# Patient Record
Sex: FEMALE | Race: WHITE | NOT HISPANIC OR LATINO | Employment: FULL TIME | URBAN - METROPOLITAN AREA
[De-identification: names, ages, dates, MRNs, and addresses within clinical notes are randomized per-mention and may not be internally consistent; named-entity substitution may affect disease eponyms.]

---

## 2024-11-14 NOTE — PROGRESS NOTES
"[facility]  Initial Evaluation    Referring Provider:  Amy Sage MD  [address] [facility] [address]  [address]  [place] [address]    Chief Complaint: \"I think I have ADHD\"     History of Present Illness: Rossi Figueroa is a 19 y.o. female who presents today for initial evaluation regarding ADHD evaluation. Rossi presents unaccompanied in no acute distress and engages with me appropriately. Psychotropic regimen with which patient presents is described as none.     Screening and history is positive for some of the following symptoms of depression-low mood, low energy, anhedonia, changes in sleep, changes in appetite, guilt, poor concentration, psychomotor changes, thoughts of being better off dead. \"I do not think I have depression.\" \"I have fatigue..my appetite has increased.\"   History is positive for signs/symptoms suggestive of trouble concentrating, trouble completing tasks, making errors in routine tasks, issues remembering obligations, trouble with organization, fidgeting, and associated irritability/anxiety with these deficits. \"I am scattered.\" \"I am either super focused or not focused at all\" \"I struggle with organization everywhere but at work\"     With regard to ADHD: I am presumptively treating patient for this issue; history as provided today, presentation today, and (mom and dad) positive family history would suggest a distinct possibility this is a contributing factor to patient's dysfunction in work/life roles and engagements irrespective to screening results. Patient has learned and successfully implemented compensatory coping skills that, with the Gnosticism of layered stressors throughout adulthood, have begun to fail. Treating ADHD symptoms will likely be a concise and appropriate route to address anxiety and mood issues that are, at least to some degree, secondary to deficits related to traits of ADHD.     ASRS-v1.1  Part A  3/6  Part B  6/12    Total  12/18 " "    Psychiatric screening is negative for pathognomonic history of: TBI, PTSD, estefany, psychosis, violence, or suicidality.   Non-stimulant medications for ADHD:  Qelbree/Atomoxetine-NRI norepinephrine reuptake inhibitor-believed to increase the levels of NE (neuroceptor believed to be affected in those with ADHD) in the brain to improved attention and focus.   FDA approved for ADHD, takes 5-8 weeks, non-addictive, lower risk of misuse/diversion, side effects:xerostomia, decreased appetite, mood swings-in worst cases illicit new SI, irritability, hypertension, sleep problems.   Guanfacine (Intuniv) alpha 2 adrenergic agonist believed to affect the receptors in the brain involved in regulating attention, impulse control, and hyperactivity. Side effects: xerostomia, hypotension, sedation, dizziness, and irritability.    I have recommended to the patient to obtain a light box that emits at 10,000 lux to be used each morning upon waking at 1 foot away from the patient, facing them at an angle (not directly) for 1 hour. The patient is reminded not to stare directly at it during exposure. I have advised for the patient to check with insurance for possible reimbursement and that this product is available at several retailers online. [email or web address] brand \"Happy Light\"   I have counseled the patient with regard to diagnoses and the recommended treatment regimen as documented below: I will assume prescriptive responsibility for to be determined.   Patient acknowledges the diagnoses per my rendered interpretation. Patient demonstrates awareness/understanding of viable alternatives for treatment as well as potential risks, benefits, and side effects associated with this regimen and is amenable to proceed in this fashion.     Recommended lifestyle changes: 30 minutes of activity to increase HR 2-3 days weekly.    Psychiatric History:  Diagnoses: anxiety, depression, but not sure   Outpatient history: therapy in the past   Inpatient " history: none  Medication trials: hydroxyzine, fluoxetine   Other treatment modalities: none  Self harm: none  Suicide attempts: No  Abuse or neglect: none    Substance Use History:   Types/methods/frequency: *marijuana  Transtheoretical stage: smoke daily     Social History:  Residence: lives house, mom and step dad  Vocation: yes, extended duties dental assistant  Source of income: Employed  Last grade completed: college  Pertinent developmental history: struggled with comprehension  Pertinent legal history: none  Hobbies/interests: [employer]  Roman Catholic: agnostic  Exercise: treadmill, circuit  Dietary habits: none  Sleep hygiene: 7 hours  Social habits: feels well supported   Sunlight: There are no concern for under-exposure.  Caffeine intake: occasionally but sensitivity  Hydration habits: no pertinent issues    history: No    Social History     Socioeconomic History    Marital status: Single   Tobacco Use    Smoking status: Never     Passive exposure: Never    Smokeless tobacco: Never   Vaping Use    Vaping status: Never Used   Substance and Sexual Activity    Alcohol use: Yes     Comment: very rare    Drug use: Yes     Types: Marijuana     Access to Firearms: yes    Tobacco use counseling/intervention: N/A, patient does not use tobacco.     PHQ-9 Depression Screening  PHQ-9 Total Score: 14    Little interest or pleasure in doing things? Several days   Feeling down, depressed, or hopeless? Not at all   PHQ-2 Total Score 1   Trouble falling or staying asleep, or sleeping too much? Almost all   Feeling tired or having little energy? Almost all   Poor appetite or overeating? Almost all   Feeling bad about yourself - or that you are a failure or have let yourself or your family down? Not at all   Trouble concentrating on things, such as reading the newspaper or watching television? Almost all   Moving or speaking so slowly that other people could have noticed? Or the opposite - being so fidgety or restless  that you have been moving around a lot more than usual? Several days   Thoughts that you would be better off dead, or of hurting yourself in some way? Not at all   PHQ-9 Total Score 14   If you checked off any problems, how difficult have these problems made it for you to do your work, take care of things at home, or get along with other people? Somewhat difficult       FEDERICO-7  Feeling nervous, anxious or on edge: Not at all  Not being able to stop or control worrying: Not at all  Worrying too much about different things: Several days  Trouble Relaxing: Several days  Being so restless that it is hard to sit still: Not at all  Feeling afraid as if something awful might happen: Not at all  Becoming easily annoyed or irritable: More than half the days  FEDERICO 7 Total Score: 4  If you checked any problems, how difficult have these problems made it for you to do your work, take care of things at home, or get along with other people: Somewhat difficult    Problem List:  There is no problem list on file for this patient.    Allergy:   Allergies   Allergen Reactions    Penicillins Rash     Strong family history        Discontinued Medications:  There are no discontinued medications.    Current Medications:   Current Outpatient Medications   Medication Sig Dispense Refill    valACYclovir (VALTREX) 1000 MG tablet Take 1 tablet by mouth Daily.      atomoxetine (Strattera) 25 MG capsule Take one tablet by mouth in am for 7 days, then take one tablet by mouth in the am, then take one tab by  mouth  in am and one tab by mouth at lunch 60 capsule 2    azithromycin (Zithromax Z-Fletcher) 250 MG tablet Take 2 tablets by mouth on day 1, then 1 tablet daily on days 2-5 (Patient not taking: Reported on 11/15/2024) 6 tablet 0    promethazine-dextromethorphan (PROMETHAZINE-DM) 6.25-15 MG/5ML syrup Take 5 mL by mouth 4 (Four) Times a Day As Needed for Cough. (Patient not taking: Reported on 11/15/2024) 120 mL 0     No current facility-administered  "medications for this visit.     Past Medical History:  Past Medical History:   Diagnosis Date    Anxiety      Past Surgical History:  Past Surgical History:   Procedure Laterality Date    EAR TUBES       Family History:   Family History   Problem Relation Age of Onset    Depression Mother     Bipolar disorder Mother     Anxiety disorder Mother     ADD / ADHD Mother     Depression Father     Anxiety disorder Father     ADD / ADHD Father     Cancer Maternal Grandfather        Mental Status Exam:   Observations:  Appearance: Neat  Speech: Normal  Eye Contact: downcast  Motor Activity: Normal  Affect:Full  Comments:  Mood:Mood: Euthymic  Cognition  Orientation Impairment: None  Memory Impairment: None  Attention: Normal  Comments:  Perception  Hallucinations:None  Other:   Comments:  Thoughts:  Suicidality:None  Homicidality:None  Delusions:  None  Comments:  Behavior:Behavior: Cooperative  Insight: Insight: Good  Judgement:Insight: Good    Vital Signs:   /74   Pulse 72   Ht 152.4 cm (60\")   Wt 58.1 kg (128 lb)   BMI 25.00 kg/m²    Lab Results:   Admission on 09/04/2024, Discharged on 09/04/2024   Component Date Value Ref Range Status    SARS Antigen 09/04/2024 Not Detected  Not Detected, Presumptive Negative Final    Influenza A Antigen CHRISTA 09/04/2024 Not Detected  Not Detected Final    Influenza B Antigen CHRISTA 09/04/2024 Not Detected  Not Detected Final    Internal Control 09/04/2024 Passed  Passed Final    Lot Number 09/04/2024 [id number]   Final    Expiration Date 09/04/2024 10/18/25   Final    Rapid Strep A Screen 09/04/2024 Negative   Final    Internal Control 09/04/2024 Passed   Final    Lot Number 09/04/2024 [id number]   Final    Expiration Date 09/04/2024 07/10/25   Final   Admission on 06/10/2024, Discharged on 06/10/2024   Component Date Value Ref Range Status    SARS Antigen 06/10/2024 Not Detected  Not Detected, Presumptive Negative Final    Influenza A Antigen CHRISTA 06/10/2024 Not Detected  Not " Detected Final    Influenza B Antigen CHRISTA 06/10/2024 Not Detected  Not Detected Final    Internal Control 06/10/2024 Passed  Passed Final    Lot Number 06/10/2024 [id number]   Final    Expiration Date 06/10/2024 2/5/25   Final       ASSESSMENT AND PLAN:    ICD-10-CM ICD-9-CM   1. Adult ADHD  F90.9 314.01       Rossi who presents today for initial evaluation regarding psychiatric consultation. We have discussed the history and interpreted diagnoses as above as well as the treatment plan below, including potential R/B/SE of the recommended regimen of which the patient demonstrates understanding. Patient is agreeable to call 911 or go to the nearest ER should she become concerned for her own safety and/or the safety of those around her. There are not overt indices of acute estefany/psychosis on evaluation today.     Medication regimen: Start atomoxetine 25 mg po q am for 7 days, then increase to 2 tabs po daily; patient is advised not to misuse prescribed medications or to use any exogenous substances that aren't disclosed to this provider as they may interact with the regimen to her detriment.   Risk Assessment: protracted risk is moderate, imminent risk is moderate.  Risk factors include:  anxiety disorder, mood disorder, and recent/ongoing psychosocial stressors. Protective factors include: no known family history of suicidality, intact reality testing, no substance use disorder, , no present SI, no stated history of suicide attempts or self-harm, patient's exhibited future-orientation, strong social support, and patient's cooperation with care. Do note that this is subject to change with the Temple of new stressors, treatment non-adherence, use of substances, and/or new medical ails.  Monitoring: reviewed labs/imaging as populated above, PHQ-9 today is PHQ-9 Total Score:  14 /27, FEDERICO-7 today is 4/21  Therapy: offered, declined  Follow-up: January   Communications: N/A    TREATMENT PLAN/GOALS: challenge patterns of  living conducive to symptom burden, implement recommended regimen as above with augmentative, intermittent supportive psychotherapy to reduce symptom burden. Patient acknowledged and verbally consented to begin treatment as above. The importance of adherence to the recommended treatment and interval follow-up appointments was emphasized today. Patient was today advised to limit daily caffeine intake, hydrate appropriately, eat healthy and nutritious foods, engage sleep hygiene measures, engage appropriate exposure to sunlight, engage with hobbies in balance with life necessities, and exercise appropriate to their capacity to do so.     Billing: I have seen the patient today and considered her psychiatric complaints, rendered a diagnosis, and discussed treatment with the patient as above with which she consents.    Parts of this note are electronic transcriptions/translations of spoken language to printed text using the Dragon Dictation system.    Electronically signed by SHAHANA Melara, 11/14/24,

## 2024-11-15 ENCOUNTER — OFFICE VISIT (OUTPATIENT)
Dept: PSYCHIATRY | Facility: CLINIC | Age: 19
End: 2024-11-15
Payer: COMMERCIAL

## 2024-11-15 VITALS
SYSTOLIC BLOOD PRESSURE: 128 MMHG | BODY MASS INDEX: 25.13 KG/M2 | HEIGHT: 60 IN | DIASTOLIC BLOOD PRESSURE: 74 MMHG | HEART RATE: 72 BPM | WEIGHT: 128 LBS

## 2024-11-15 DIAGNOSIS — F33.0 MDD (MAJOR DEPRESSIVE DISORDER), RECURRENT EPISODE, MILD: ICD-10-CM

## 2024-11-15 DIAGNOSIS — F90.9 ADULT ADHD: Primary | ICD-10-CM

## 2024-11-15 RX ORDER — ATOMOXETINE 25 MG/1
CAPSULE ORAL
Qty: 60 CAPSULE | Refills: 2 | Status: SHIPPED | OUTPATIENT
Start: 2024-11-15

## 2025-01-02 NOTE — PROGRESS NOTES
"[facility]  Follow-up Visit    Chief Complaint: \"I think I have ADHD\"      History of Present Illness: Rossi Figueroa is a 19 y.o. female who presents today for initial evaluation regarding ADHD evaluation. Rossi presents unaccompanied in no acute distress and engages with me appropriately. Psychotropic regimen with which patient presents is described as none.      Screening and history is positive for some of the following symptoms of depression-low mood, low energy, anhedonia, changes in sleep, changes in appetite, guilt, poor concentration, psychomotor changes, thoughts of being better off dead. \"I do not think I have depression.\" \"I have fatigue..my appetite has increased.\"   History is positive for signs/symptoms suggestive of trouble concentrating, trouble completing tasks, making errors in routine tasks, issues remembering obligations, trouble with organization, fidgeting, and associated irritability/anxiety with these deficits. \"I am scattered.\" \"I am either super focused or not focused at all\" \"I struggle with organization everywhere but at work\"      With regard to ADHD: I am presumptively treating patient for this issue; history as provided today, presentation today, and (mom and dad) positive family history would suggest a distinct possibility this is a contributing factor to patient's dysfunction in work/life roles and engagements irrespective to screening results. Patient has learned and successfully implemented compensatory coping skills that, with the Uatsdin of layered stressors throughout adulthood, have begun to fail. Treating ADHD symptoms will likely be a concise and appropriate route to address anxiety and mood issues that are, at least to some degree, secondary to deficits related to traits of ADHD.      ASRS-v1.1  Part A  3/6  Part B  6/12     Total  12/18      Psychiatric screening is negative for pathognomonic history of: TBI, PTSD, estefany, psychosis, " "violence, or suicidality.   Non-stimulant medications for ADHD:  Qelbree/Atomoxetine-NRI norepinephrine reuptake inhibitor-believed to increase the levels of NE (neuroceptor believed to be affected in those with ADHD) in the brain to improved attention and focus.   FDA approved for ADHD, takes 5-8 weeks, non-addictive, lower risk of misuse/diversion, side effects:xerostomia, decreased appetite, mood swings-in worst cases illicit new SI, irritability, hypertension, sleep problems.   Guanfacine (Intuniv) alpha 2 adrenergic agonist believed to affect the receptors in the brain involved in regulating attention, impulse control, and hyperactivity. Side effects: xerostomia, hypotension, sedation, dizziness, and irritability.    I have recommended to the patient to obtain a light box that emits at 10,000 lux to be used each morning upon waking at 1 foot away from the patient, facing them at an angle (not directly) for 1 hour. The patient is reminded not to stare directly at it during exposure. I have advised for the patient to check with insurance for possible reimbursement and that this product is available at several retailers online. [email or web address] brand \"Happy Light\"   I have counseled the patient with regard to diagnoses and the recommended treatment regimen as documented below: I will assume prescriptive responsibility for to be determined.   Patient acknowledges the diagnoses per my rendered interpretation. Patient demonstrates awareness/understanding of viable alternatives for treatment as well as potential risks, benefits, and side effects associated with this regimen and is amenable to proceed in this fashion.      Recommended lifestyle changes: 30 minutes of activity to increase HR 2-3 days weekly.     Psychiatric History:  Diagnoses: anxiety, depression, but not sure   Outpatient history: therapy in the past   Inpatient history: none  Medication trials: hydroxyzine, fluoxetine   Other treatment modalities: none  Self " harm: none  Suicide attempts: No  Abuse or neglect: none     Substance Use History:   Types/methods/frequency: *marijuana  Transtheoretical stage: smoke daily      Social History:  Residence: lives house, mom and step dad  Vocation: yes, extended duties dental assistant  Source of income: Employed  Last grade completed: college  Pertinent developmental history: struggled with comprehension  Pertinent legal history: none  Hobbies/interests: [employer]  Jewish: agnostic  Exercise: treadmill, circuit  Dietary habits: none  Sleep hygiene: 7 hours  Social habits: feels well supported   Sunlight: There are no concern for under-exposure.  Caffeine intake: occasionally but sensitivity  Hydration habits: no pertinent issues    history: No       Interval History Rossi is a 19 y.o. female who presents today for follow-up. Rossi presents unaccompanied in no acute distress and engages with me appropriately.   Current treatment regimen includes:   - atomoxetine 25 mg po q am  Side-effects per given history: none.      Today the patient feels okay-she has had problems remembering taking the second dose. She requests a higher dose to be taken once daily. She reports being busier this past month with work and the holidays. Thought process and content are devoid of overt aberration suggestive of acute estefany/psychosis. The patient denies SI/HI/AVH. There are not changes on exam today compared to most recent evaluation.    - sleep: no concerns  - appetite: poorly controlled    Will have patient take 2 x 25 mg po q am of atomoxetine, then when supply exhausted take atomoxetine 60 mg po q am.   I have counseled the patient with regard to diagnoses and the recommended treatment regimen as documented below. Patient acknowledges the diagnoses per my rendered interpretation. Patient demonstrates understanding of potential risks/benefits/side effects associated with this regimen and is amenable to proceed in this fashion.        Social History     Socioeconomic History    Marital status: Single   Tobacco Use    Smoking status: Never     Passive exposure: Never    Smokeless tobacco: Never   Vaping Use    Vaping status: Never Used   Substance and Sexual Activity    Alcohol use: Yes     Comment: very rare    Drug use: Yes     Types: Marijuana       Tobacco use counseling/intervention: N/A.   Problem List:  There is no problem list on file for this patient.    Allergy:   Allergies   Allergen Reactions    Penicillins Rash     Strong family history        Discontinued Medications:  Medications Discontinued During This Encounter   Medication Reason    atomoxetine (Strattera) 25 MG capsule Dose adjustment       Current Medications:   Current Outpatient Medications   Medication Sig Dispense Refill    valACYclovir (VALTREX) 1000 MG tablet Take 1 tablet by mouth Daily.      atomoxetine (STRATTERA) 60 MG capsule Take 1 capsule by mouth Every Morning for 90 days. 30 capsule 2    azithromycin (Zithromax Z-Fletcher) 250 MG tablet Take 2 tablets by mouth on day 1, then 1 tablet daily on days 2-5 (Patient not taking: Reported on 1/3/2025) 6 tablet 0    promethazine-dextromethorphan (PROMETHAZINE-DM) 6.25-15 MG/5ML syrup Take 5 mL by mouth 4 (Four) Times a Day As Needed for Cough. (Patient not taking: Reported on 1/3/2025) 120 mL 0     No current facility-administered medications for this visit.     Past Medical History:  Past Medical History:   Diagnosis Date    Anxiety      Past Surgical History:  Past Surgical History:   Procedure Laterality Date    EAR TUBES         MENTAL STATUS EXAM   General Appearance:  Cleanly groomed and dressed and well developed  Eye Contact:  Good eye contact  Attitude:  Cooperative, polite and candid  Motor Activity:  Normal gait, posture  Muscle Strength:  Normal  Speech:  Normal rate, tone, volume  Language:  Spontaneous  Mood and affect:  Normal, pleasant  Hopelessness:  Denies  Loneliness: Denies  Thought Process:   "Logical  Associations/ Thought Content:  No delusions  Hallucinations:  None  Suicidal Ideations:  Not present  Homicidal Ideation:  Not present  Sensorium:  Alert and clear  Orientation:  Person, place, time and situation  Immediate Recall, Recent, and Remote Memory:  Intact  Attention Span/ Concentration:  Good  Fund of Knowledge:  Appropriate for age and educational level  Intellectual Functioning:  Average range  Insight:  Good  Judgement:  Good  Reliability:  Good  Impulse Control:  Good      Vital Signs:   /73   Pulse 63   Ht 152.4 cm (60\")   Wt 55.2 kg (121 lb 9.6 oz)   BMI 23.75 kg/m²    Lab Results:   Admission on 09/04/2024, Discharged on 09/04/2024   Component Date Value Ref Range Status    SARS Antigen 09/04/2024 Not Detected  Not Detected, Presumptive Negative Final    Influenza A Antigen CHRISTA 09/04/2024 Not Detected  Not Detected Final    Influenza B Antigen CHRISTA 09/04/2024 Not Detected  Not Detected Final    Internal Control 09/04/2024 Passed  Passed Final    Lot Number 09/04/2024 [id number]   Final    Expiration Date 09/04/2024 10/18/25   Final    Rapid Strep A Screen 09/04/2024 Negative   Final    Internal Control 09/04/2024 Passed   Final    Lot Number 09/04/2024 [id number]   Final    Expiration Date 09/04/2024 07/10/25   Final       ASSESSMENT AND PLAN:    ICD-10-CM ICD-9-CM   1. Adult ADHD  F90.9 314.01   2. MDD (major depressive disorder), recurrent episode, mild  F33.0 296.31       Rossi is a 19 y.o. female who presents today for follow-up regarding medication check. . We have discussed the interval history and the treatment plan below, including potential R/B/SE of the recommended regimen of which the patient demonstrates understanding. Patient is agreeable to call 911 or go to the nearest ER should she become concerned for her own safety and/or the safety of those around her. There are are no overt indices of acute estefany/psychosis on exam today. BRIJESH reviewed and is as " expected.    Medication regimen:  should patient choose she may take atomoxetine 25 mg, take two tabs po q am, then when supply exhausted take atomoxetine 60 mg po q am; patient is advised not to misuse prescribed medications or to use them with any exogenous substances that aren't disclosed to this provider as they may interact with the regimen to the patient's detriment.   Risk Assessment: protracted risk is moderate, imminent risk is low. Do note that this is subject to change with the Sabianist of new stressors, treatment non-adherence, use of substances, and/or new medical ails.   Monitoring: reviewed labs/imaging as populated above; ordered  Therapy: declined, deferred  Follow-up: 2-3 months  Communications: N/A    TREATMENT PLAN/GOALS: challenge patterns of living conducive to symptom burden, implement recommended regimen as above with augmentative, intermittent supportive psychotherapy to reduce symptom burden. Patient acknowledged and verbally consented to continue treatment. The importance of adherence to the recommended treatment and interval follow-up appointments was again emphasized today: patient has fair treatment adherence per given history. Patient was today reminded to limit daily caffeine intake, hydrate appropriately, eat healthy and nutritious foods, engage sleep hygiene measures, engage appropriate exposure to sunlight, engage with hobbies in balance with life necessities, and exercise appropriate to their capacity to do so.       Parts of this note are electronic transcriptions/translations of spoken language to printed text using the Dragon Dictation system.    Electronically signed by SHAHANA Melara, 01/02/25

## 2025-01-03 ENCOUNTER — OFFICE VISIT (OUTPATIENT)
Dept: PSYCHIATRY | Facility: CLINIC | Age: 20
End: 2025-01-03
Payer: COMMERCIAL

## 2025-01-03 VITALS
DIASTOLIC BLOOD PRESSURE: 73 MMHG | HEART RATE: 63 BPM | WEIGHT: 121.6 LBS | BODY MASS INDEX: 23.87 KG/M2 | SYSTOLIC BLOOD PRESSURE: 123 MMHG | HEIGHT: 60 IN

## 2025-01-03 DIAGNOSIS — F90.9 ADULT ADHD: Primary | ICD-10-CM

## 2025-01-03 DIAGNOSIS — F33.0 MDD (MAJOR DEPRESSIVE DISORDER), RECURRENT EPISODE, MILD: ICD-10-CM

## 2025-01-03 RX ORDER — ATOMOXETINE 60 MG/1
60 CAPSULE ORAL EVERY MORNING
Qty: 30 CAPSULE | Refills: 2 | Status: SHIPPED | OUTPATIENT
Start: 2025-01-03 | End: 2025-04-03

## 2025-02-06 DIAGNOSIS — F90.9 ADULT ADHD: ICD-10-CM

## 2025-02-07 RX ORDER — ATOMOXETINE 60 MG/1
60 CAPSULE ORAL EVERY MORNING
Qty: 30 CAPSULE | Refills: 2 | OUTPATIENT
Start: 2025-02-07

## 2025-02-07 NOTE — TELEPHONE ENCOUNTER
NEXT VISIT WITH PROVIDER   Appointment with Kina Faustin APRN (04/04/2025)     LAST SEEN BY PROVIDER   Office Visit with Kina Faustin APRN (01/03/2025)     LAST MED REFILL  atomoxetine (STRATTERA) 60 MG capsule (01/03/2025)     TOO SOON FOR REFILL     PROVIDER PLEASE ADVISE

## 2025-04-02 NOTE — PROGRESS NOTES
"[facility]  Follow-up Visit    Chief Complaint: \"I think I have ADHD\"      History of Present Illness: Rossi Figueroa is a 19 y.o. female who presents today for initial evaluation regarding ADHD evaluation. Rossi presents unaccompanied in no acute distress and engages with me appropriately. Psychotropic regimen with which patient presents is described as none.      Screening and history is positive for some of the following symptoms of depression-low mood, low energy, anhedonia, changes in sleep, changes in appetite, guilt, poor concentration, psychomotor changes, thoughts of being better off dead. \"I do not think I have depression.\" \"I have fatigue..my appetite has increased.\"   History is positive for signs/symptoms suggestive of trouble concentrating, trouble completing tasks, making errors in routine tasks, issues remembering obligations, trouble with organization, fidgeting, and associated irritability/anxiety with these deficits. \"I am scattered.\" \"I am either super focused or not focused at all\" \"I struggle with organization everywhere but at work\"      With regard to ADHD: I am presumptively treating patient for this issue; history as provided today, presentation today, and (mom and dad) positive family history would suggest a distinct possibility this is a contributing factor to patient's dysfunction in work/life roles and engagements irrespective to screening results. Patient has learned and successfully implemented compensatory coping skills that, with the Bahai of layered stressors throughout adulthood, have begun to fail. Treating ADHD symptoms will likely be a concise and appropriate route to address anxiety and mood issues that are, at least to some degree, secondary to deficits related to traits of ADHD.      ASRS-v1.1  Part A  3/6  Part B  6/12     Total  12/18      Psychiatric screening is negative for pathognomonic history of: TBI, PTSD, estefany, psychosis, " "violence, or suicidality.   Non-stimulant medications for ADHD:  Qelbree/Atomoxetine-NRI norepinephrine reuptake inhibitor-believed to increase the levels of NE (neuroceptor believed to be affected in those with ADHD) in the brain to improved attention and focus.   FDA approved for ADHD, takes 5-8 weeks, non-addictive, lower risk of misuse/diversion, side effects:xerostomia, decreased appetite, mood swings-in worst cases illicit new SI, irritability, hypertension, sleep problems.   Guanfacine (Intuniv) alpha 2 adrenergic agonist believed to affect the receptors in the brain involved in regulating attention, impulse control, and hyperactivity. Side effects: xerostomia, hypotension, sedation, dizziness, and irritability.    I have recommended to the patient to obtain a light box that emits at 10,000 lux to be used each morning upon waking at 1 foot away from the patient, facing them at an angle (not directly) for 1 hour. The patient is reminded not to stare directly at it during exposure. I have advised for the patient to check with insurance for possible reimbursement and that this product is available at several retailers online. [employer] brand \"Happy Light\"   I have counseled the patient with regard to diagnoses and the recommended treatment regimen as documented below: I will assume prescriptive responsibility for to be determined.   Patient acknowledges the diagnoses per my rendered interpretation. Patient demonstrates awareness/understanding of viable alternatives for treatment as well as potential risks, benefits, and side effects associated with this regimen and is amenable to proceed in this fashion.      Recommended lifestyle changes: 30 minutes of activity to increase HR 2-3 days weekly.     Psychiatric History:  Diagnoses: anxiety, depression, but not sure   Outpatient history: therapy in the past   Inpatient history: none  Medication trials: hydroxyzine, fluoxetine   Other treatment modalities: none  Self " harm: none  Suicide attempts: No  Abuse or neglect: none     Substance Use History:   Types/methods/frequency: *marijuana  Transtheoretical stage: smoke daily      Social History:  Residence: lives house, mom and step dad  Vocation: yes, extended duties dental assistant  Source of income: Employed  Last grade completed: college  Pertinent developmental history: struggled with comprehension  Pertinent legal history: none  Hobbies/interests: [employer]  Taoism: agnostic  Exercise: treadmill, circuit  Dietary habits: none  Sleep hygiene: 7 hours  Social habits: feels well supported   Sunlight: There are no concern for under-exposure.  Caffeine intake: occasionally but sensitivity  Hydration habits: no pertinent issues    history: No       Interval History Rossi is a 20 y.o. female who presents today for follow-up. Rossi presents unaccompanied in no acute distress and engages with me appropriately.   Current treatment regimen includes:   -Atomoxetine 60 mg p.o. every morning  Side-effects per given history: none.      Today the patient feels frustrated. She feels like the atomoxetine is not working. She desires to try another non-stimulant as she says her mother has warned her to avoid stimulants. She reports some stressors at her job as the leadership dynamic has recently changed. She has taken on some extra responsibilities that has increased her stress levels and has created some shifts in her mood. She reports increase in fatigue-mentally and physically as she endures the increased job responsibilities. She reports that she would like to work through it without medication intervention. Thought process and content are devoid of overt aberration suggestive of acute estefany/psychosis. The patient denies SI/HI/AVH. There are not changes on exam today compared to most recent evaluation.    - sleep: no concerns  - appetite: moderately controlled    Offered to increase atomoxetine. Patient declines, stating that  she has not felt any changes on the medication at all. Patient may stop atomoxetine.   Advised patient that we can try Qelbree 200 mg po q 1 week, then 200 mg po q BID (samples) provided. Reviewed side effect profile-FDA approved for ADHD, takes 5-8 weeks, non-addictive, lower risk of misuse/diversion, side effects:xerostomia, decreased appetite, mood swings-in worst cases illicit new SI, irritability, hypertension, sleep problems. Advised patient that insurance may not cover this medication and that a prior authorization may be needed. Advised patient that we (our office would keep her informed of any denials of coverage.   I have counseled the patient with regard to diagnoses and the recommended treatment regimen as documented below. Patient acknowledges the diagnoses per my rendered interpretation. Patient demonstrates understanding of potential risks/benefits/side effects associated with this regimen and is amenable to proceed in this fashion.       Social History     Socioeconomic History    Marital status: Single   Tobacco Use    Smoking status: Never     Passive exposure: Never    Smokeless tobacco: Never   Vaping Use    Vaping status: Never Used   Substance and Sexual Activity    Alcohol use: Yes     Comment: very rare    Drug use: Yes     Types: Marijuana       Tobacco use counseling/intervention: patient does not use tobacco.   Problem List:  There is no problem list on file for this patient.    Allergy:   Allergies   Allergen Reactions    Penicillins Rash     Strong family history        Discontinued Medications:  There are no discontinued medications.    Current Medications:   Current Outpatient Medications   Medication Sig Dispense Refill    azithromycin (Zithromax Z-Fletcher) 250 MG tablet Take 2 tablets by mouth on day 1, then 1 tablet daily on days 2-5 (Patient not taking: Reported on 1/3/2025) 6 tablet 0    promethazine-dextromethorphan (PROMETHAZINE-DM) 6.25-15 MG/5ML syrup Take 5 mL by mouth 4 (Four)  Times a Day As Needed for Cough. (Patient not taking: Reported on 1/3/2025) 120 mL 0    valACYclovir (VALTREX) 1000 MG tablet Take 1 tablet by mouth Daily.      Viloxazine HCl ER (Qelbree) 200 MG capsule sustained-release 24 hr Take 1 capsule by mouth Daily for 7 days. 7 capsule 0    Viloxazine HCl ER (Qelbree) 200 MG capsule sustained-release 24 hr Take 1 capsule by mouth 2 (Two) Times a Day for 7 days. 14 capsule 0    Viloxazine HCl ER (Qelbree) 200 MG capsule sustained-release 24 hr Take 1 capsule by mouth 2 (Two) Times a Day for 90 days. 60 capsule 2     No current facility-administered medications for this visit.     Past Medical History:  Past Medical History:   Diagnosis Date    Anxiety      Past Surgical History:  Past Surgical History:   Procedure Laterality Date    EAR TUBES         MENTAL STATUS EXAM   General Appearance:  Cleanly groomed and dressed and well developed  Eye Contact:  Good eye contact  Attitude:  Cooperative, polite and candid  Motor Activity:  Normal gait, posture  Muscle Strength:  Normal  Speech:  Normal rate, tone, volume  Language:  Spontaneous  Mood and affect:  Other and normal, pleasant  Other Comment:  Appeared mildy depressed  Thought Process:  Logical and goal-directed  Associations/ Thought Content:  No delusions  Hallucinations:  None  Suicidal Ideations:  Not present  Homicidal Ideation:  Not present  Sensorium:  Alert and clear  Orientation:  Person, place, time and situation  Immediate Recall, Recent, and Remote Memory:  Intact  Attention Span/ Concentration:  Good  Fund of Knowledge:  Appropriate for age and educational level  Intellectual Functioning:  Average range  Insight:  Good  Judgement:  Good  Reliability:  Good  Impulse Control:  Good      Vital Signs:   There were no vitals taken for this visit.   Lab Results:   No visits with results within 6 Month(s) from this visit.   Latest known visit with results is:   Admission on 09/04/2024, Discharged on 09/04/2024    Component Date Value Ref Range Status    SARS Antigen 09/04/2024 Not Detected  Not Detected, Presumptive Negative Final    Influenza A Antigen CHRISTA 09/04/2024 Not Detected  Not Detected Final    Influenza B Antigen CHRISTA 09/04/2024 Not Detected  Not Detected Final    Internal Control 09/04/2024 Passed  Passed Final    Lot Number 09/04/2024 [id number]   Final    Expiration Date 09/04/2024 10/18/25   Final    Rapid Strep A Screen 09/04/2024 Negative   Final    Internal Control 09/04/2024 Passed   Final    Lot Number 09/04/2024 [id number]   Final    Expiration Date 09/04/2024 07/10/25   Final       ASSESSMENT AND PLAN:    ICD-10-CM ICD-9-CM   1. Adult ADHD  F90.9 314.01       Rossi is a 20 y.o. female who presents today for follow-up regarding medication changes. We have discussed the interval history and the treatment plan below, including potential R/B/SE of the recommended regimen of which the patient demonstrates understanding. Patient is agreeable to call 911 or go to the nearest ER should she become concerned for her own safety and/or the safety of those around her. There are are no overt indices of acute estefany/psychosis on exam today. BRIJESH reviewed and is as expected.    Medication regimen: stop atomoxetine, begin Qelbree 200 mg po q am x 7 days, then start Qelbree 200 mg po twice daily for 7 days-then continue 200 mg po twice daily; patient is advised not to misuse prescribed medications or to use them with any exogenous substances that aren't disclosed to this provider as they may interact with the regimen to the patient's detriment.   Risk Assessment: protracted risk is moderate, imminent risk is low.  Do note that this is subject to change with the Congregational of new stressors, treatment non-adherence, use of substances, and/or new medical ails.   Monitoring: reviewed labs/imaging as populated above; ordered  Therapy: declined/deferred  Follow-up: one month  Communications: N/A    TREATMENT PLAN/GOALS:  challenge patterns of living conducive to symptom burden, implement recommended regimen as above with augmentative, intermittent supportive psychotherapy to reduce symptom burden. Patient acknowledged and verbally consented to continue treatment. The importance of adherence to the recommended treatment and interval follow-up appointments was again emphasized today: patient has good treatment adherence per given history. Patient was today reminded to limit daily caffeine intake, hydrate appropriately, eat healthy and nutritious foods, engage sleep hygiene measures, engage appropriate exposure to sunlight, engage with hobbies in balance with life necessities, and exercise appropriate to their capacity to do so.       Parts of this note are electronic transcriptions/translations of spoken language to printed text using the Dragon Dictation system.    Electronically signed by SHAHANA Melara, 04/02/25

## 2025-04-04 ENCOUNTER — OFFICE VISIT (OUTPATIENT)
Dept: PSYCHIATRY | Facility: CLINIC | Age: 20
End: 2025-04-04
Payer: COMMERCIAL

## 2025-04-04 DIAGNOSIS — F90.9 ADULT ADHD: Primary | ICD-10-CM

## 2025-04-04 RX ORDER — VILOXAZINE HYDROCHLORIDE 200 MG/1
200 CAPSULE, EXTENDED RELEASE ORAL 2 TIMES DAILY
Qty: 14 CAPSULE | Refills: 0 | COMMUNITY
Start: 2025-04-04 | End: 2025-04-11

## 2025-04-04 RX ORDER — VILOXAZINE HYDROCHLORIDE 200 MG/1
200 CAPSULE, EXTENDED RELEASE ORAL 2 TIMES DAILY
Qty: 60 CAPSULE | Refills: 2 | Status: SHIPPED | OUTPATIENT
Start: 2025-04-04 | End: 2025-07-03

## 2025-04-04 RX ORDER — VILOXAZINE HYDROCHLORIDE 200 MG/1
1 CAPSULE, EXTENDED RELEASE ORAL DAILY
Qty: 7 CAPSULE | Refills: 0 | COMMUNITY
Start: 2025-04-04 | End: 2025-04-11

## 2025-04-08 DIAGNOSIS — F90.9 ADULT ADHD: ICD-10-CM

## 2025-04-08 RX ORDER — ATOMOXETINE 60 MG/1
60 CAPSULE ORAL EVERY MORNING
Qty: 30 CAPSULE | Refills: 2 | OUTPATIENT
Start: 2025-04-08

## 2025-04-08 NOTE — TELEPHONE ENCOUNTER
REFILL REQUEST:     atomoxetine (STRATTERA) 60 MG capsule (01/03/2025)     F/UP- 05/16/2025.  LOV: 04/04/2025.

## 2025-04-10 ENCOUNTER — TELEPHONE (OUTPATIENT)
Dept: PSYCHIATRY | Facility: CLINIC | Age: 20
End: 2025-04-10
Payer: COMMERCIAL
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
## 2025-04-11 NOTE — TELEPHONE ENCOUNTER
Called patient and informed them that the prior authorization for their medication had been approved by their insurance.

## 2025-04-14 ENCOUNTER — OFFICE VISIT (OUTPATIENT)
Dept: OBSTETRICS AND GYNECOLOGY | Age: 20
End: 2025-04-14
Payer: COMMERCIAL

## 2025-04-14 VITALS
HEIGHT: 60 IN | HEART RATE: 63 BPM | DIASTOLIC BLOOD PRESSURE: 86 MMHG | WEIGHT: 117 LBS | SYSTOLIC BLOOD PRESSURE: 128 MMHG | BODY MASS INDEX: 22.97 KG/M2

## 2025-04-14 DIAGNOSIS — Z01.419 WELL WOMAN EXAM WITH ROUTINE GYNECOLOGICAL EXAM: Primary | ICD-10-CM

## 2025-04-14 DIAGNOSIS — Z11.3 SCREENING FOR STD (SEXUALLY TRANSMITTED DISEASE): ICD-10-CM

## 2025-04-14 DIAGNOSIS — T74.21XA SEXUAL ASSAULT OF ADULT, INITIAL ENCOUNTER: ICD-10-CM

## 2025-04-14 DIAGNOSIS — A64 SEXUALLY TRANSMITTED DISEASE: ICD-10-CM

## 2025-04-14 DIAGNOSIS — M62.89 HIGH-TONE PELVIC FLOOR DYSFUNCTION: ICD-10-CM

## 2025-04-14 PROBLEM — F32.A DEPRESSION: Status: ACTIVE | Noted: 2023-05-25

## 2025-04-14 PROBLEM — F90.9 ADHD: Status: ACTIVE | Noted: 2023-05-25

## 2025-04-14 PROBLEM — F41.9 ANXIETY: Status: ACTIVE | Noted: 2023-05-25

## 2025-04-14 LAB
C TRACH RRNA CVX QL NAA+PROBE: NOT DETECTED
CANDIDA SPECIES: NEGATIVE
GARDNERELLA VAGINALIS: NEGATIVE
N GONORRHOEA RRNA SPEC QL NAA+PROBE: NOT DETECTED
T VAGINALIS DNA VAG QL PROBE+SIG AMP: NEGATIVE

## 2025-04-14 PROCEDURE — 87480 CANDIDA DNA DIR PROBE: CPT | Performed by: OBSTETRICS & GYNECOLOGY

## 2025-04-14 PROCEDURE — G0123 SCREEN CERV/VAG THIN LAYER: HCPCS | Performed by: OBSTETRICS & GYNECOLOGY

## 2025-04-14 PROCEDURE — 87660 TRICHOMONAS VAGIN DIR PROBE: CPT | Performed by: OBSTETRICS & GYNECOLOGY

## 2025-04-14 PROCEDURE — 87510 GARDNER VAG DNA DIR PROBE: CPT | Performed by: OBSTETRICS & GYNECOLOGY

## 2025-04-14 PROCEDURE — 87591 N.GONORRHOEAE DNA AMP PROB: CPT | Performed by: OBSTETRICS & GYNECOLOGY

## 2025-04-14 PROCEDURE — 87491 CHLMYD TRACH DNA AMP PROBE: CPT | Performed by: OBSTETRICS & GYNECOLOGY
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
## 2025-04-16 LAB
CONV .: NORMAL
CYTOLOGIST CVX/VAG CYTO: NORMAL
CYTOLOGY CVX/VAG DOC CYTO: NORMAL
CYTOLOGY CVX/VAG DOC THIN PREP: NORMAL
DX ICD CODE: NORMAL
OTHER STN SPEC: NORMAL
SERVICE CMNT-IMP: NORMAL
STAT OF ADQ CVX/VAG CYTO-IMP: NORMAL

## 2025-04-21 ENCOUNTER — PATIENT ROUNDING (BHMG ONLY) (OUTPATIENT)
Dept: OBSTETRICS AND GYNECOLOGY | Age: 20
End: 2025-04-21
Payer: COMMERCIAL

## 2025-04-21 ENCOUNTER — PATIENT MESSAGE (OUTPATIENT)
Dept: OBSTETRICS AND GYNECOLOGY | Age: 20
End: 2025-04-21
Payer: COMMERCIAL

## 2025-04-21 NOTE — PROGRESS NOTES
A My-Chart message has been sent to the patient for PATIENT ROUNDING with [facility]
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
## 2025-06-04 ENCOUNTER — TELEMEDICINE (OUTPATIENT)
Dept: PSYCHIATRY | Facility: CLINIC | Age: 20
End: 2025-06-04
Payer: COMMERCIAL

## 2025-06-04 DIAGNOSIS — F90.9 ADULT ADHD: Primary | ICD-10-CM

## 2025-06-04 DIAGNOSIS — F33.0 MDD (MAJOR DEPRESSIVE DISORDER), RECURRENT EPISODE, MILD: ICD-10-CM

## 2025-06-04 RX ORDER — DEXTROAMPHETAMINE SACCHARATE, AMPHETAMINE ASPARTATE MONOHYDRATE, DEXTROAMPHETAMINE SULFATE AND AMPHETAMINE SULFATE 2.5; 2.5; 2.5; 2.5 MG/1; MG/1; MG/1; MG/1
10 CAPSULE, EXTENDED RELEASE ORAL EVERY MORNING
Qty: 30 CAPSULE | Refills: 0 | Status: SHIPPED | OUTPATIENT
Start: 2025-06-04 | End: 2025-07-04

## 2025-06-06 ENCOUNTER — LAB (OUTPATIENT)
Dept: LAB | Facility: HOSPITAL | Age: 20
End: 2025-06-06
Payer: COMMERCIAL

## 2025-06-06 DIAGNOSIS — F90.9 ADULT ADHD: ICD-10-CM

## 2025-06-06 LAB
AMPHET+METHAMPHET UR QL: NEGATIVE
AMPHETAMINES UR QL: NEGATIVE
BARBITURATES UR QL SCN: NEGATIVE
BENZODIAZ UR QL SCN: NEGATIVE
BUPRENORPHINE SERPL-MCNC: NEGATIVE NG/ML
CANNABINOIDS SERPL QL: POSITIVE
COCAINE UR QL: NEGATIVE
FENTANYL UR-MCNC: NEGATIVE NG/ML
METHADONE UR QL SCN: NEGATIVE
OPIATES UR QL: NEGATIVE
OXYCODONE UR QL SCN: NEGATIVE
PCP UR QL SCN: NEGATIVE
TRICYCLICS UR QL SCN: NEGATIVE

## 2025-06-06 PROCEDURE — 80307 DRUG TEST PRSMV CHEM ANLYZR: CPT

## 2025-07-16 DIAGNOSIS — F90.9 ADULT ADHD: ICD-10-CM

## 2025-07-16 RX ORDER — DEXTROAMPHETAMINE SACCHARATE, AMPHETAMINE ASPARTATE MONOHYDRATE, DEXTROAMPHETAMINE SULFATE AND AMPHETAMINE SULFATE 3.75; 3.75; 3.75; 3.75 MG/1; MG/1; MG/1; MG/1
15 CAPSULE, EXTENDED RELEASE ORAL DAILY
Qty: 30 CAPSULE | Refills: 0 | Status: SHIPPED | OUTPATIENT
Start: 2025-07-16 | End: 2025-08-15

## 2025-07-16 RX ORDER — DEXTROAMPHETAMINE SACCHARATE, AMPHETAMINE ASPARTATE MONOHYDRATE, DEXTROAMPHETAMINE SULFATE AND AMPHETAMINE SULFATE 2.5; 2.5; 2.5; 2.5 MG/1; MG/1; MG/1; MG/1
10 CAPSULE, EXTENDED RELEASE ORAL EVERY MORNING
Qty: 30 CAPSULE | Refills: 0 | Status: CANCELLED | OUTPATIENT
Start: 2025-07-16 | End: 2025-08-15

## 2025-07-16 NOTE — TELEPHONE ENCOUNTER
CONTROLLED MEDICATION REFILL REQUEST    STATE REGULATION APPT EVERY 3 MONTHS     UDS(URINE DRUG SCREEN) EVERY 6 MONTHS OR UP TO PROVIDER PREFERENCE   ([place] & [place] 1 PER YEAR)     NEW NARC CONSENT EVERY YEAR      MEDICATION: amphetamine-dextroamphetamine XR (Adderall XR) 10 MG 24 hr capsule (06/04/2025)      NEXT OFFICE VISIT: NONE IN EPIC     LAST OFFICE VISIT: Telemedicine with Kina Faustin APRN (06/04/2025)     NARC CONSENT: CONTROLLED SUBSTANCE AGREEMENT - SCAN - Controlled substance agreement, Behavioral Health (06/06/2025)     URINE DRUG SCREEN(STANDING ORDER)   ([place] 1 PER YEAR): Urine Drug Screen - Urine, Clean Catch (06/06/2025 13:00)  Fentanyl, Urine - Urine, Clean Catch (06/06/2025 13:00)

## 2025-07-16 NOTE — TELEPHONE ENCOUNTER
PT(PATIENT) VERIFIED     NEXT APPT WITH PROVIDER  Appointment with Kina Faustin APRN (2025)     PLEASE ADVISE

## 2025-08-18 ENCOUNTER — TELEPHONE (OUTPATIENT)
Dept: OBSTETRICS AND GYNECOLOGY | Age: 20
End: 2025-08-18
Payer: COMMERCIAL

## 2025-08-19 ENCOUNTER — OFFICE VISIT (OUTPATIENT)
Dept: OBSTETRICS AND GYNECOLOGY | Age: 20
End: 2025-08-19
Payer: COMMERCIAL

## 2025-08-19 VITALS
BODY MASS INDEX: 23.44 KG/M2 | SYSTOLIC BLOOD PRESSURE: 139 MMHG | WEIGHT: 120 LBS | DIASTOLIC BLOOD PRESSURE: 91 MMHG | HEART RATE: 113 BPM

## 2025-08-19 DIAGNOSIS — N89.8 VAGINAL IRRITATION: Primary | ICD-10-CM

## 2025-08-19 PROCEDURE — 87661 TRICHOMONAS VAGINALIS AMPLIF: CPT | Performed by: NURSE PRACTITIONER

## 2025-08-19 PROCEDURE — 87491 CHLMYD TRACH DNA AMP PROBE: CPT | Performed by: NURSE PRACTITIONER

## 2025-08-19 PROCEDURE — 87591 N.GONORRHOEAE DNA AMP PROB: CPT | Performed by: NURSE PRACTITIONER

## 2025-08-19 PROCEDURE — 87801 DETECT AGNT MULT DNA AMPLI: CPT | Performed by: NURSE PRACTITIONER

## 2025-08-19 PROCEDURE — 87798 DETECT AGENT NOS DNA AMP: CPT | Performed by: NURSE PRACTITIONER

## 2025-08-19 RX ORDER — NYSTATIN AND TRIAMCINOLONE ACETONIDE 100000; 1 [USP'U]/G; MG/G
1 OINTMENT TOPICAL 2 TIMES DAILY
Qty: 60 G | Refills: 2 | Status: SHIPPED | OUTPATIENT
Start: 2025-08-19

## 2025-08-19 RX ORDER — VALACYCLOVIR HYDROCHLORIDE 1 G/1
1000 TABLET, FILM COATED ORAL DAILY
COMMUNITY

## 2025-08-19 RX ORDER — DEXTROAMPHETAMINE SACCHARATE, AMPHETAMINE ASPARTATE MONOHYDRATE, DEXTROAMPHETAMINE SULFATE AND AMPHETAMINE SULFATE 2.5; 2.5; 2.5; 2.5 MG/1; MG/1; MG/1; MG/1
15 CAPSULE, EXTENDED RELEASE ORAL EVERY MORNING
COMMUNITY

## 2025-08-22 ENCOUNTER — RESULTS FOLLOW-UP (OUTPATIENT)
Dept: OBSTETRICS AND GYNECOLOGY | Age: 20
End: 2025-08-22
Payer: COMMERCIAL

## 2025-08-22 DIAGNOSIS — B37.9 CANDIDIASIS: Primary | ICD-10-CM

## 2025-08-22 LAB
A VAGINAE DNA VAG QL NAA+PROBE: ABNORMAL SCORE
BVAB2 DNA VAG QL NAA+PROBE: ABNORMAL SCORE
C ALBICANS DNA VAG QL NAA+PROBE: POSITIVE
C GLABRATA DNA VAG QL NAA+PROBE: NEGATIVE
C TRACH DNA SPEC QL NAA+PROBE: NEGATIVE
MEGA1 DNA VAG QL NAA+PROBE: ABNORMAL SCORE
N GONORRHOEA DNA VAG QL NAA+PROBE: NEGATIVE
T VAGINALIS DNA VAG QL NAA+PROBE: NEGATIVE

## 2025-08-22 RX ORDER — FLUCONAZOLE 150 MG/1
TABLET ORAL
Qty: 2 TABLET | Refills: 0 | Status: SHIPPED | OUTPATIENT
Start: 2025-08-22